# Patient Record
(demographics unavailable — no encounter records)

---

## 2025-01-21 NOTE — HISTORY OF PRESENT ILLNESS
[FreeTextEntry1] : 40yo male presents for initial evaluation  Referred by GI Dr Manjit Cantrell Reason for visit "hemorrhoid"  PMH Crohn's disease, colon polyps Meds - nitrofurantoin, oxybutynin, Adderall, Prilosec OTC PSH - spinal cord surgery NK[employer] Social  (+) cannabis  Had a colonoscopy October 2023 with Dr Cantrell. Diagnosed with Crohn's, pateint reports asymptomatic.   Patient w/ newly diagnosed Crohn's currently not on medication, pending approval of Pentasa 500mg.  Patient presents c/o presence external tissue for the past 2 years Patient is wheel chair bound and states he gets a lot of irritation and sores in perianal area. States area will bleed at times Has used Neosporin here and there but has never been Rxed anything  Patient has no sensation from the waist down and is wheel chair bound Denies any issues with BMs.

## 2025-01-21 NOTE — PHYSICAL EXAM
[FreeTextEntry1] : Medical assistant present for duration of physical examination   General no acute distress, alert and oriented Psych calm, responding appropriately to questions Nonlabored breathing (+) wheelchair bound  Skin - edema and non blanching erythema of skin overlying coccyx   Anorectal Exam: Inspection - no fissure, small posterior residual external hemorrhoidal tag  SHARON nontender, no masses palpated, no blood on gloved finger   Procedure: Anoscopy   Pre procedure Diagnosis: hemorrhoid Post procedure Diagnosis: hemorrhoid Anesthesia: none Estimated blood loss: none Specimen: none Complications: none   Consent obtained. Anoscopy was performed by passing a lighted anoscope with lubricant jelly into the anal canal and the entire anal mucosal surface was inspected. Findings included no fissure, mild internal hemorrhoids, no visible masses or lesions in anal canal, proctitis of visible distal rectum/anal canal mucosa   Patient tolerated examination and procedure well.

## 2025-01-21 NOTE — ASSESSMENT
[FreeTextEntry1] : Discussed local wound care to avoid pressure sore development. Recommend continued supportive care for residual external hemorrhoidal tag in setting of Crohn's disease.  Recommend continued medical management per GI. He is pending starting medication for Crohn's with his GI.  All questions answered.